# Patient Record
Sex: MALE | Race: WHITE
[De-identification: names, ages, dates, MRNs, and addresses within clinical notes are randomized per-mention and may not be internally consistent; named-entity substitution may affect disease eponyms.]

---

## 2019-01-20 ENCOUNTER — HOSPITAL ENCOUNTER (EMERGENCY)
Dept: HOSPITAL 41 - JD.ED | Age: 29
Discharge: HOME | End: 2019-01-20
Payer: SELF-PAY

## 2019-01-20 VITALS — SYSTOLIC BLOOD PRESSURE: 128 MMHG | DIASTOLIC BLOOD PRESSURE: 88 MMHG

## 2019-01-20 DIAGNOSIS — S00.33XA: Primary | ICD-10-CM
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
## 2019-01-20 DIAGNOSIS — F17.210: ICD-10-CM

## 2019-01-20 PROCEDURE — 99283 EMERGENCY DEPT VISIT LOW MDM: CPT

## 2019-01-20 PROCEDURE — 70160 X-RAY EXAM OF NASAL BONES: CPT

## 2019-01-20 NOTE — EDM.PDOC
ED HPI GENERAL MEDICAL PROBLEM





- General


Chief Complaint: ENT Problem


Stated Complaint: nose injury


Time Seen by Provider: 01/20/19 16:53


Source of Information: Reports: Patient


History Limitations: Reports: No Limitations





- History of Present Illness


INITIAL COMMENTS - FREE TEXT/NARRATIVE: 





28-year-old male presents to the ED for evaluation of nasal injury. Patient 

states she was chopping wood and it piece of wood came up and struck him in the 

undersurface of his nose active bleeding from both sides of the nose. Patient 

has had previous nasal surgery with cartilage grafting. Patient's major 

injuries to the columella with it being partially torn and actively bleeding. 

He denies any other injuries. He has previous injuries to his midface with 

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 He has been able to get the bleeding to stop by 

pinching the nose. Of note the patient has had multiple surgeries to 

reconstruct his midface after it was stomped on by a bowl lobe overriding. He 

had a blowout fracture of his left orbit and has lost vision in his left eye 

almost completely he can see some shadows. He had of his nose repaired as well 

at that time.


Onset: Today


Onset Date: 01/20/19


Duration: Minutes:


Location: Reports: Face (Facial injury primarily to his nose. From blunt trauma)


Quality: Reports: Ache


Severity: Moderate


Improves with: Reports: None


Worsens with: Reports: None


Context: Reports: Trauma (Blunt trauma to the nose from a piece of wood the 

floor probably was chopping it.).  Denies: Activity, Exercise, Lifting, Sick 

Contact, Other


Associated Symptoms: Reports: No Other Symptoms


Treatments PTA: Reports: Other (see below) (None.)


  ** Nare


Pain Score (Numeric/FACES): 3





- Related Data


 Allergies











Allergy/AdvReac Type Severity Reaction Status Date / Time


 


No Known Allergies Allergy   Verified 01/20/19 16:46











Home Meds: 


 Home Meds





. [No Known Home Meds]  01/20/19 [History]











Past Medical History





- Past Health History


Medical/Surgical History: Denies Medical/Surgical History





- Past Surgical History


Other HEENT Surgeries/Procedures: Patient suffered severe left-sided facial 

trauma when a bed. On his face trouble writing. Suffered multiple extensive 

fractures throughout the left rasta-face requiring major reconstructive surgery. 

Left eye was also injured it with traumatic cataract present. He is currently 

blind in the left eye. Also loss of tear duct apparatus dacrocystogram medial 

left eye. He is scheduled for reconstructive surgery on his left eye. The 

eyelid so that he might be able to close the eye more effectively and cataract 

extraction sometime next week.


Dermatological Surgical History: Reports: Plastic Surgical Reconstruction/

Repair (Face. Patient had multiple surgeries to reconstruct his midface after 

it was crushed by a bull that he was riding. Included loss of eyesight in his 

left eye from a blowout fracture of the orbit. Id. the orbit reconstructed as 

well as his nose and mid forehead.)





Social & Family History





- Tobacco Use


Smoking Status *Q: Current Every Day Smoker


Years of Tobacco use: 10


Packs/Tins Daily: 0.5





- Caffeine Use


Caffeine Use: Reports: Coffee





- Recreational Drug Use


Recreational Drug Use: No





- Living Situation & Occupation


Living situation: Reports: Single


Occupation: Employed





ED ROS ENT





- Review of Systems


Review Of Systems: See Below


Constitutional: Reports: No Symptoms


HEENT: Reports: Nosebleed, Nose Pain


Respiratory: Reports: No Symptoms (After suffering blunt trauma to the nose 

within the last hour)


Cardiovascular: Reports: No Symptoms


Endocrine: Reports: No Symptoms


GI/Abdominal: Reports: No Symptoms


: Reports: No Symptoms


Musculoskeletal: Reports: No Symptoms


Skin: Reports: No Symptoms


Neurological: Reports: No Symptoms


Psychiatric: Reports: No Symptoms


Hematologic/Lymphatic: Reports: No Symptoms


Immunologic: Reports: No Symptoms





ED EXAM, ENT





- Physical Exam


Exam: See Below


Exam Limited By: No Limitations


General Appearance: Alert, WD/WN, No Apparent Distress


Eye Exam: Right Eye: Other (Blindness left eye.)


Nose: Other (Patient has dried blood in both naris. The septum appears to be 

cartilaginous and you can see evidence of previous surgery. Known nasal 

hematoma appreciated. Has a nasal polyp on the right side. Major injuries to 

the columella where it's been abraded from tip of the nose to the face. It is 

still attached to the naris but he suffered partial thickness skin loss in this 

area.)


Mouth/Throat: Normal Inspection, Normal Gums, Normal Teeth, Other (No mid 

facial pain to suggest mid facial fractures. Previous surgical scars at the 

bridge of his nose)


Head: Atraumatic, Normocephalic, Other (Previous mid facial reconstructive 

surgery scars noted)


Neck: Normal Inspection, Supple, Non-Tender, Full Range of Motion


Respiratory/Chest: No Respiratory Distress, Lungs Clear, Normal Breath Sounds


Cardiovascular: Normal Peripheral Pulses, Regular Rate, Rhythm, No Edema, No 

Gallop, No Murmur, No Rub





Course





- Vital Signs


Last Recorded V/S: 


 Last Vital Signs











Temp  36.6 C   01/20/19 16:45


 


Pulse  96   01/20/19 16:45


 


Resp  16   01/20/19 16:45


 


BP  128/88   01/20/19 16:45


 


Pulse Ox  98   01/20/19 16:45














- Orders/Labs/Meds


Meds: 


Medications














Discontinued Medications














Generic Name Dose Route Start Last Admin





  Trade Name Jenelle  PRN Reason Stop Dose Admin


 


Mupirocin  22 gm  01/20/19 17:38  01/20/19 17:42





  Bactroban Oint  TOP  01/20/19 17:39  22 gm





  ONETIME ONE   Administration





     





     





     





     














- Radiology Interpretation


Free Text/Narrative:: 


28-year-old male presents the ED after suffering blunt trauma to the nose. This 

occurred while he was chopping wood and a piece of wood came up and struck him 

on the undersurface of his nose. Patient is said previous reconstructive 

surgery on his nose is concerned about whether or not he broke it. His major 

injury is actually to the columella where it's been essentially skinned her 

deeply abraded from the tip of the nose to where it attaches to his upper 

maxilla. It is intact however after I cleanse it with saline and there is 

nothing really to suture. He has blood in both naris that is dried and bleeding 

appears to stop. He said previous surgical reconstruction of the nasal septum 

with cartilage. There is a nasal polyp on the right side. No septal hematomas 

evident at this time. Plan nasal bone x-rays to be done.








- Re-Assessments/Exams


Free Text/Narrative Re-Assessment/Exam: 





01/20/19 17:40  nasal bone x-rays reveal evidence of previous mid facial smash 

injury with repair. Single screw is noted within the left orbital rim. There is 

severe mucosal thickening noted within both maxillary sinuses. He has a wire 

that it's helped to reconstruct his nasal septum or vomer. There  is slight 

deformity noted within the distal nasal bone .Unable to tell if this is old or 

new. Plan topical bactroban  and ointment applied to the columella twice daily 

until healed.




















Departure





- Departure


Time of Disposition: 17:40


Disposition: Home, Self-Care 01


Condition: Fair


Clinical Impression: 


 Contusion of nose, initial encounter, Abrasion, nose w/o infection








- Discharge Information


*PRESCRIPTION DRUG MONITORING PROGRAM REVIEWED*: Not Applicable


*COPY OF PRESCRIPTION DRUG MONITORING REPORT IN PATIENT EDUIN: Not Applicable


Instructions:  Contusion, Easy-to-Read


Referrals: 


PCP,None [Primary Care Provider] - 


Forms:  ED Department Discharge


Additional Instructions: 


Evaluation the emergency room today in regards to blunt force trauma to the 

inferior aspect of your nose when a piece of which were chopping came up and 

struck you in the midface. Did primarily in damage to the columella or the 

central part of the nose attaches tip of the nose to the face. This resulted in 

an abrasion of this area with a slight laceration at the tip of the nose. 

Sutures are not required. X-rays of the nasal bones reveal evidence of previous 

surgical repair and a minimal deformity of the tip of the nose which is 

difficult to tell if it's new or old. Weight it will heal if it is recently 

injured on its own. Treatment is topical Bactroban ointment applied to the 

damaged area of the midportion of the nose twice daily bedtime and morning 

until healed which will be the better part of a week.

## 2019-01-20 NOTE — CR
Nasal bone: Three views of the nasal bone were obtained.

 

Previous surgery is seen with surgical sutures.  Slight deformity is 

noted within the distal nasal bone.  This is compatible with a 

fracture but uncertain if this is acute or old.  There is severe 

mucosal thickening noted within both maxillary sinuses.  Single screw 

is noted within the left orbital rim.

 

Impression:

1.  Severe mucosal thickening within both maxillary sinuses.  Previous

 facial surgery.

2.  Mild deformity within the distal nasal bone compatible with 

fracture, uncertain if this is acute or old.

 

Diagnostic code #3

## 2022-04-21 ENCOUNTER — HOSPITAL ENCOUNTER (EMERGENCY)
Dept: HOSPITAL 41 - JD.ED | Age: 32
Discharge: HOME | End: 2022-04-21
Payer: COMMERCIAL

## 2022-04-21 VITALS — HEART RATE: 74 BPM | SYSTOLIC BLOOD PRESSURE: 131 MMHG | DIASTOLIC BLOOD PRESSURE: 94 MMHG

## 2022-04-21 DIAGNOSIS — S01.511A: Primary | ICD-10-CM

## 2022-04-21 DIAGNOSIS — F17.210: ICD-10-CM

## 2022-04-21 DIAGNOSIS — Z79.899: ICD-10-CM

## 2022-04-21 DIAGNOSIS — Z23: ICD-10-CM
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
## 2022-04-21 PROCEDURE — 90715 TDAP VACCINE 7 YRS/> IM: CPT

## 2022-04-21 PROCEDURE — 99282 EMERGENCY DEPT VISIT SF MDM: CPT

## 2022-04-21 PROCEDURE — 12011 RPR F/E/E/N/L/M 2.5 CM/<: CPT

## 2022-04-21 PROCEDURE — 90471 IMMUNIZATION ADMIN: CPT
